# Patient Record
(demographics unavailable — no encounter records)

---

## 2024-12-03 NOTE — ASSESSMENT
[FreeTextEntry1] : Benign breast cancer surveillance examination.  The patient will return in 4 months for reexam, or sooner as needed.  Her next left mammogram will be due in May of next year.

## 2024-12-03 NOTE — HISTORY OF PRESENT ILLNESS
[de-identified] : The patient returns with her daughter for her scheduled breast cancer surveillance examination.  She looks and feels well and she notes no new symptoms or changes in either breast region.  Right MRM 1993, left WLE June 2023 without SLNB or RT.  t1b N0 IDC with DCIS, ER and AK positive, H ER 2 negative, Ki-67 was 5%.  She was on tamoxifen which was switched to exemestane as she was treated for a DVT.

## 2024-12-03 NOTE — PHYSICAL EXAM
[de-identified] : No adenopathy [de-identified] : Right mastectomy site is soft and flat, with no evidence of local recurrence.  The presternal keloid is unchanged.  Left breast reveals no nipple discharge, nipple retraction, or suspicious skin  change.  No new masses or suspicious areas are palpable in the left breast and there is no axillary adenopathy bilaterally.  No right upper extremity edema.

## 2024-12-23 NOTE — ASSESSMENT
[FreeTextEntry1] : Chronic cough likely GERD resolved on PPI  new 3 WEEKS COUGH LIKELY POST INFECTIOUS rad / haad resolved  HO RA   8 mm lung nodule stable on repeat CT.

## 2024-12-23 NOTE — HISTORY OF PRESENT ILLNESS
[Initial Evaluation] : an initial evaluation of [Cough] : cough [Dyspnea] : no dyspnea [Nasal Congestion] : no nasal congestion [Rhinorrhea] : no rhinorrhea [Heartburn] : heartburn [Currently Experiencing] : The patient is currently experiencing symptoms.

## 2025-01-13 NOTE — PHYSICAL EXAM
[Ambulatory and capable of all self care but unable to carry out any work activities] : Status 2- Ambulatory and capable of all self care but unable to carry out any work activities. Up and about more than 50% of waking hours [Obese] : obese [Normal] : affect appropriate [de-identified] : s/p RT mastectomy, keloid in medial aspect of RT chest wall. Left breast S/p lumpectomy

## 2025-01-13 NOTE — HISTORY OF PRESENT ILLNESS
[Disease: _____________________] : Disease: [unfilled] [T: ___] : T[unfilled] [AJCC Stage: ____] : AJCC Stage: [unfilled] [de-identified] : This is a very pleasant 73 yr old post menopausal woman who is s/p left breast lumpectomy on  and is recovering well. She is accompanied by her daughter today. She has h/o diabetes, elevated cholesterol, Rheumatoid arthritis (she takes methotrexate and enbrel), and osteoarthritis. She also has h/o right breast Ca in which she had a right breast mastectomy in .(no reconstruction).  Her work up so far:  23 screening mammo showed dense breast, mass in the left breast. BIRADS-0  23 diagnostic mammo Predominantly circumscribed mass in the left medial breast corresponds to the abnormality noted on the screening mammogram. Stereotactic guided biopsy is recommended.Other fluctuating circumscribed masses throughout the left breast are consistent with benign cysts. Diffuse benign type calcifications are seen in the left breast. ULTRASOUND: Multiple scattered benign cysts are seen throughout the left breast. Round hypoechoic mass in left breast, 2-3 o'clock axis, 7 cm from the nipple measures 0.7 x 0.6 x 0.5 cm. This is likely a benign complicated cyst. Sonographic follow-up is recommended in 6 months to demonstrate stability. There is no left axillary adenopathy. IMPRESSION: 1. Mass in the medial left breast seen on mammography as above. Stereotactic guided biopsy is recommended. 2. Probably benign complicated cyst in the left breast seen on ultrasound. Sonographic follow-up is recommended in 6 months to demonstrate stability. 23 biopsy showed moderately differentiated IDC, ER/AL positive, Ki67 5%  23 MRI RIGHT BREAST: Status post right mastectomy with no suspicious enhancement in the mastectomy bed. There is no axillary adenopathy. LEFT BREAST: There is a 0.8 cm enhancing mass in the medial left breast with an associated biopsy marker with biopsy-proven index carcinoma. There is a nonenhancing complicated cyst at the lateral aspect of the left breast middle depth, corresponding to findings seen on recent ultrasound and is benign. The nipple and skin appear normal. There is no axillary adenopathy. The imaged portions of the chest and abdomen demonstrates bibasilar atelectasis..  USG giuided biopsy  showed a colloid variant of ductal carcinoma, strongly ER and AL positive, Ki-67 5%, H ER 2 neg 23 s/p Left breast lumpectomy pathology left medial mass-invasive moderately differentiated ductal carcinoma w/ mucinous features, 5.5mm. Non extensive DCIS, solid cribriform type, intermediate group. Margins are negative and additional margins were taken. No [id number]. ER/%, HER2 negative Ki 67 5%  Pt is doing well with no post op complications.   menarche was at age 14 and first pregnancy was at age 28. She is a  who nursed for 1 year, never used hormones and had her last menstrual period at age 52. [de-identified] : 1/23/24 Patient is here to follow up for breast cancer, accompanied by her daughter. She took Tamoxifen and discontinued after being diagnosed with LLE DVT. She is compliant with Eliquis, denies any bleeding or bruising. She feels well, denies any new breast lump/mass, skin changes or nipple discharge on the left breast. She is s/p right mastectomy 10 years ago, did not receive chemo, RT or hormonal therapy as per pt. She has rheumatoid arthritis, on Methotrexate and Embrel, arthritic pain is controlled with sporadic flare.  Last colonoscopy was in 2018.  5/20/24 Pt is here for follow up. She was started on Exemestane in 1/24. C/O pain and swelling in the right leg since 4/11/24. Went to ER and saw Vascular. She Dc'ed Eliquis after 3 months of AC per Dr. oCle.  07/09/24: Pt is here for follow up. She was started on Exemestane in 1/24.  she is tolerating treatment well, she is on Methotrexate from Rheumatology.  She had left breast Mammogram from 05/06/24 with results as: Impression: Status post a left lumpectomy with stable architectural distortion most consistent with postsurgical change. No evidence of malignancy. 1/13/25 Pt is here lalitha follow up. Tolerating Exemestane and MTX On Ca + D On ASA now, stopped Eliquis

## 2025-01-13 NOTE — REVIEW OF SYSTEMS
[Diarrhea: Grade 0] : Diarrhea: Grade 0 [Negative] : Allergic/Immunologic [FreeTextEntry2] : Last colonoscopy in 2018, last GYN exam 3/23

## 2025-01-13 NOTE — ASSESSMENT
[FreeTextEntry1] : This is a very pleasant 74 yr old postmenopausal woman who is diagnosed with clinical stage IA pT1b N x M x strongly ER and MI positive, Ki-67 5%, HER 2 neg of the left breast.  Pt is s/p LT lumpectomy on 6/27/23 with pathology showing T1bNx 5.5mm IDC, ER/MI positive, HER2 negative, Ki67 5%. The margins were negative.  She has a PMH of Right breast cancer s/p mastectomy.  Pt initially started Tamoxifen on 9/2023.  Venous duplex on 11/29/23 showed Left lower extremity DVT in the distal popliteal, peroneal, soleal. Pt was on Tamoxifen which was then Dced Started Exemestane in 12/23 RECOMMENDATIONS Previous notes reviewed and all relevant laboratory and radiology results discussed with and were communicated to the patient and her daughter. Continue Exemestane 25 mg daily  -- Mammogram LT 11/24 -- Follow up with Breast Surgeon as scheduled. -- DEXA scan 2/24 showed Osteoporosis, Patient started on Reclast once yearly from the PMD ( 5/24).  -- Follow up with rheumatologist as recommended. -- Continue to follow up with PCP, gyne and GI for health maintenance and screening.  #DVT -- Discontinued Eliquis as per Vascular. On ASA now -- Repeat venous duplex did not show DVT.  RTC for a follow up in 6 months.

## 2025-01-13 NOTE — RESULTS/DATA
[FreeTextEntry1] : 74-year-old female with leg pain and swelling  COMPARISON: None available.  TECHNIQUE: Duplex sonography of the BILATERAL LOWER extremity veins with color and spectral Doppler, with and without compression.  FINDINGS:  RIGHT: Normal compressibility of the RIGHT common femoral, femoral and popliteal veins. Doppler examination shows normal spontaneous and phasic flow. No RIGHT calf vein thrombosis is detected.  LEFT: Normal compressibility of the LEFT common femoral, femoral and popliteal veins. Doppler examination shows normal spontaneous and phasic flow. No LEFT calf vein thrombosis is detected.  IMPRESSION: No evidence of deep venous thrombosis in either lower extremity.      --- End of Report ---

## 2025-04-09 NOTE — PHYSICAL EXAM
[de-identified] : Obese but otherwise healthy [de-identified] : No adenopathy [de-identified] : The right mastectomy site is soft and flat, with no suspicious skin changes and no evidence of local recurrence.  The presternal keloid is stable.  The left breast does not reveal any nipple discharge, nipple retraction, or suspicious skin change.  There is slight indentation of the WLE site in the inner breast, but no discrete masses or suspicious areas are noted.  No axillary adenopathy bilaterally.  No right upper extremity lymphedema.

## 2025-04-09 NOTE — ASSESSMENT
[FreeTextEntry1] : Benign breast cancer surveillance examination.  The patient will return in 6 months for reexam, or sooner as needed her next left mammogram is due next month and an appropriate requisition was provided.  All her questions were answered.

## 2025-04-09 NOTE — HISTORY OF PRESENT ILLNESS
[de-identified] : The patient returns with her daughter for her scheduled breast cancer surveillance examination.  She notes no new symptoms or changes in either breast region.  She had a right MRM in 1993 and a left WLE by me in June 2023, for a T1b N0 IDC which was ER and LA positive, H ER 2 negative, Ki-67 15%.

## 2025-05-08 NOTE — HISTORY OF PRESENT ILLNESS
[FreeTextEntry1] : 76 y/o p2 LMP age 52, natural , no hrt. here for wwe.  no bleeding, pain or discharge.  on occasion, has itchiness by groin.  followed for ca breast.    h/o osteoporosis  Colonoscopy 2023    right mastectomy, ca breast, follows at [facility]  dm - metformin  rheumatoid arthritis - mtx, folate, enbrel  hypothyroid - levothyroxine  hypercholesterolemia - atrovastatin  non smoker

## 2025-05-08 NOTE — PLAN
[FreeTextEntry1] : 74 y/o p2 with normal exam, breast ca and intertrigo stable bse/calcium for lotrisone precautions f/u for annual

## 2025-05-08 NOTE — HISTORY OF PRESENT ILLNESS
[FreeTextEntry1] : 76 y/o p2 LMP age 52, natural , no hrt. here for wwe.  no bleeding, pain or discharge.  on occasion, has itchiness by groin.  followed for ca breast.    h/o osteoporosis  Colonoscopy 2023    right mastectomy, ca breast, follows at [facility]  dm - metformin  rheumatoid arthritis - mtx, folate, enbrel  hypothyroid - levothyroxine  hypercholesterolemia - atrovastatin  non smoker

## 2025-05-08 NOTE — PLAN
[FreeTextEntry1] : 76 y/o p2 with normal exam, breast ca and intertrigo stable bse/calcium for lotrisone precautions f/u for annual

## 2025-05-08 NOTE — PHYSICAL EXAM
[RN] : RN [Appropriately responsive] : appropriately responsive [Alert] : alert [No Acute Distress] : no acute distress [No Lymphadenopathy] : no lymphadenopathy [Soft] : soft [Non-tender] : non-tender [Non-distended] : non-distended [No HSM] : No HSM [No Lesions] : no lesions [No Mass] : no mass [Oriented x3] : oriented x3 [Examination Of The Breasts] : a normal appearance [No Masses] : no breast masses were palpable [Normal rectal exam] : was normal [Normal Brown Stool] : was normal and brown [Normal] : was normal [None] : there was no rectal mass  [FreeTextEntry2] : Emelyn [Internal Hemorrhoid] : no internal hemorrhoids were present [External Hemorrhoid] : no external hemorrhoids were present [Skin Tags] : no residual hemorrhoidal skin tags

## 2025-07-22 NOTE — PHYSICAL EXAM
[Ambulatory and capable of all self care but unable to carry out any work activities] : Status 2- Ambulatory and capable of all self care but unable to carry out any work activities. Up and about more than 50% of waking hours [Obese] : obese [Normal] : affect appropriate [de-identified] : s/p RT mastectomy, keloid in medial aspect of RT chest wall. Left breast S/p lumpectomy

## 2025-07-22 NOTE — ASSESSMENT
[FreeTextEntry1] : This is a very pleasant 75 yr old postmenopausal woman who is diagnosed with clinical stage IA pT1b N x M x strongly ER and VT positive, Ki-67 5%, HER 2 neg of the left breast.  Pt is s/p LT lumpectomy on 6/27/23 with pathology showing T1bNx 5.5mm IDC, ER/VT positive, HER2 negative, Ki67 5%. The margins were negative.  She has a PMH of Right breast cancer s/p mastectomy.  Pt initially started Tamoxifen on 9/2023.  Venous duplex on 11/29/23 showed Left lower extremity DVT in the distal popliteal, peroneal, soleal. Pt was on Tamoxifen which was then Dced Started Exemestane in 12/23 RECOMMENDATIONS Previous notes reviewed and all relevant laboratory and radiology results discussed with and were communicated to the patient and her daughter. Continue Exemestane 25 mg daily  -- Mammogram LT 5/25 NEG -- Follow up with Breast Surgeon as scheduled. -- DEXA scan 2/24 showed Osteoporosis, Patient started on Reclast once yearly from the PMD ( 5/25).  -- Follow up with rheumatologist as recommended. -- Continue to follow up with PCP, gyne and GI for health maintenance and screening.  #DVT -- Discontinued Eliquis as per Vascular. On ASA now -- Repeat venous duplex did not show DVT. # cellulitis LLE  Continue ABX RTC for a follow up in 6 months.

## 2025-07-22 NOTE — HISTORY OF PRESENT ILLNESS
[Disease: _____________________] : Disease: [unfilled] [T: ___] : T[unfilled] [AJCC Stage: ____] : AJCC Stage: [unfilled] [de-identified] : This is a very pleasant 73 yr old post menopausal woman who is s/p left breast lumpectomy on  and is recovering well. She is accompanied by her daughter today. She has h/o diabetes, elevated cholesterol, Rheumatoid arthritis (she takes methotrexate and enbrel), and osteoarthritis. She also has h/o right breast Ca in which she had a right breast mastectomy in .(no reconstruction).  Her work up so far:  23 screening mammo showed dense breast, mass in the left breast. BIRADS-0  23 diagnostic mammo Predominantly circumscribed mass in the left medial breast corresponds to the abnormality noted on the screening mammogram. Stereotactic guided biopsy is recommended.Other fluctuating circumscribed masses throughout the left breast are consistent with benign cysts. Diffuse benign type calcifications are seen in the left breast. ULTRASOUND: Multiple scattered benign cysts are seen throughout the left breast. Round hypoechoic mass in left breast, 2-3 o'clock axis, 7 cm from the nipple measures 0.7 x 0.6 x 0.5 cm. This is likely a benign complicated cyst. Sonographic follow-up is recommended in 6 months to demonstrate stability. There is no left axillary adenopathy. IMPRESSION: 1. Mass in the medial left breast seen on mammography as above. Stereotactic guided biopsy is recommended. 2. Probably benign complicated cyst in the left breast seen on ultrasound. Sonographic follow-up is recommended in 6 months to demonstrate stability. 23 biopsy showed moderately differentiated IDC, ER/TN positive, Ki67 5%  23 MRI RIGHT BREAST: Status post right mastectomy with no suspicious enhancement in the mastectomy bed. There is no axillary adenopathy. LEFT BREAST: There is a 0.8 cm enhancing mass in the medial left breast with an associated biopsy marker with biopsy-proven index carcinoma. There is a nonenhancing complicated cyst at the lateral aspect of the left breast middle depth, corresponding to findings seen on recent ultrasound and is benign. The nipple and skin appear normal. There is no axillary adenopathy. The imaged portions of the chest and abdomen demonstrates bibasilar atelectasis..  USG giuided biopsy  showed a colloid variant of ductal carcinoma, strongly ER and TN positive, Ki-67 5%, H ER 2 neg 23 s/p Left breast lumpectomy pathology left medial mass-invasive moderately differentiated ductal carcinoma w/ mucinous features, 5.5mm. Non extensive DCIS, solid cribriform type, intermediate group. Margins are negative and additional margins were taken. No [id number]. ER/%, HER2 negative Ki 67 5%  Pt is doing well with no post op complications.   menarche was at age 14 and first pregnancy was at age 28. She is a  who nursed for 1 year, never used hormones and had her last menstrual period at age 52. [de-identified] : 1/23/24 Patient is here to follow up for breast cancer, accompanied by her daughter. She took Tamoxifen and discontinued after being diagnosed with LLE DVT. She is compliant with Eliquis, denies any bleeding or bruising. She feels well, denies any new breast lump/mass, skin changes or nipple discharge on the left breast. She is s/p right mastectomy 10 years ago, did not receive chemo, RT or hormonal therapy as per pt. She has rheumatoid arthritis, on Methotrexate and Embrel, arthritic pain is controlled with sporadic flare.  Last colonoscopy was in 2018.  5/20/24 Pt is here for follow up. She was started on Exemestane in 1/24. C/O pain and swelling in the right leg since 4/11/24. Went to ER and saw Vascular. She Dc'ed Eliquis after 3 months of AC per Dr. Cole.  07/09/24: Pt is here for follow up. She was started on Exemestane in 1/24.  she is tolerating treatment well, she is on Methotrexate from Rheumatology.  She had left breast Mammogram from 05/06/24 with results as: Impression: Status post a left lumpectomy with stable architectural distortion most consistent with postsurgical change. No evidence of malignancy. 1/13/25 Pt is here lalitha follow up. Tolerating Exemestane and MTX On Ca + D On ASA now, stopped Eliquis 7/22/25  Patient here for follow up, feeling well.  She denies any new complaints.  Patient denies any new palpable breast lumps or pain, denies skin changes, denies nipple discharge.  Patient denies cough, shortness of breath, denies fever, denies bone pain. Compliant with her AI, Ca + D Pt has developed cellulitis. Had Doppler, no DVT. She is on Cephalexin and Bactrim